# Patient Record
Sex: MALE | Race: WHITE | NOT HISPANIC OR LATINO
[De-identification: names, ages, dates, MRNs, and addresses within clinical notes are randomized per-mention and may not be internally consistent; named-entity substitution may affect disease eponyms.]

---

## 2023-01-23 ENCOUNTER — APPOINTMENT (OUTPATIENT)
Dept: PULMONOLOGY | Facility: CLINIC | Age: 53
End: 2023-01-23
Payer: COMMERCIAL

## 2023-01-23 VITALS
SYSTOLIC BLOOD PRESSURE: 151 MMHG | OXYGEN SATURATION: 97 % | HEART RATE: 65 BPM | TEMPERATURE: 97.7 F | WEIGHT: 265 LBS | DIASTOLIC BLOOD PRESSURE: 88 MMHG | BODY MASS INDEX: 34.01 KG/M2 | HEIGHT: 74 IN

## 2023-01-23 DIAGNOSIS — R63.5 ABNORMAL WEIGHT GAIN: ICD-10-CM

## 2023-01-23 PROBLEM — Z00.00 ENCOUNTER FOR PREVENTIVE HEALTH EXAMINATION: Status: ACTIVE | Noted: 2023-01-23

## 2023-01-23 PROCEDURE — 99204 OFFICE O/P NEW MOD 45 MIN: CPT

## 2023-01-23 NOTE — PHYSICAL EXAM
[General Appearance - In No Acute Distress] : no acute distress [Micrognathia] : micrognathia [II] : II [Neck Appearance] : the appearance of the neck was normal [Neck Cervical Mass (___cm)] : no neck mass was observed [Apical Impulse] : the apical impulse was normal [Heart Sounds] : normal S1 and S2 [] : no respiratory distress [Exaggerated Use Of Accessory Muscles For Inspiration] : no accessory muscle use [Abnormal Walk] : normal gait [Involuntary Movements] : no involuntary movements were seen [No Focal Deficits] : no focal deficits [Oriented To Time, Place, And Person] : oriented to person, place, and time [Affect] : the affect was normal

## 2023-01-25 NOTE — HISTORY OF PRESENT ILLNESS
[FreeTextEntry1] : 01/23/2023 :  MERON GERARD is a 52 year old never smoker male with PMHx HLD who is here for initial evaluation of insomnia and possible sleep apnea.\par \par He reports snoring and occasional dry mouth but denies morning HA. He gained over 20lb in the pandemic. He denies dozing off. He started experiencing insomnia about a year ago \par \par He takes Advil PM, NyQuil which helps him the most, melatonin, and "adrenal complex."  His PCP started him on \par clonazepam 0.5 since March but he only takes it  once/week with some improvement. \par \par Sleep Routine:\par \par He goes to bed at 10:30, sleep latency is about 20 min, he wakes up once, WASO varies and then he is up at 5A. He does not nap . No real excessive daytime somnolence with [place] sleepiness scale (out of 24 points) = 2.\par He denies cataplexy, RLS, parasomnia, or any other sleep behavioral issues. \par \par

## 2023-01-25 NOTE — ASSESSMENT
[FreeTextEntry1] : 53 y/o obese M with c/o insomnia and possible apnea who is here for initial visit.  Clonazepam OK for anxiety, advised him to avoid Nyquil, other OTC rx which is unlikely to be helpful.  Discussed sleep hygiene.  Will get overnight polysomnography to r/o sleep disordered breathing and assess sleep, suspect will need CBT for insomnia. \par

## 2023-02-06 ENCOUNTER — OUTPATIENT (OUTPATIENT)
Dept: OUTPATIENT SERVICES | Facility: HOSPITAL | Age: 53
LOS: 1 days | End: 2023-02-06
Payer: COMMERCIAL

## 2023-02-06 ENCOUNTER — APPOINTMENT (OUTPATIENT)
Dept: SLEEP CENTER | Facility: HOSPITAL | Age: 53
End: 2023-02-06

## 2023-02-06 DIAGNOSIS — G47.00 INSOMNIA, UNSPECIFIED: ICD-10-CM

## 2023-02-06 PROCEDURE — 95810 POLYSOM 6/> YRS 4/> PARAM: CPT

## 2023-02-06 PROCEDURE — 95810 POLYSOM 6/> YRS 4/> PARAM: CPT | Mod: 26

## 2023-03-06 ENCOUNTER — APPOINTMENT (OUTPATIENT)
Dept: PULMONOLOGY | Facility: CLINIC | Age: 53
End: 2023-03-06
Payer: COMMERCIAL

## 2023-03-06 VITALS
WEIGHT: 256 LBS | RESPIRATION RATE: 12 BRPM | TEMPERATURE: 97.7 F | HEIGHT: 74 IN | SYSTOLIC BLOOD PRESSURE: 133 MMHG | OXYGEN SATURATION: 97 % | HEART RATE: 65 BPM | DIASTOLIC BLOOD PRESSURE: 87 MMHG | BODY MASS INDEX: 32.85 KG/M2

## 2023-03-06 DIAGNOSIS — R06.83 SNORING: ICD-10-CM

## 2023-03-06 DIAGNOSIS — G47.00 INSOMNIA, UNSPECIFIED: ICD-10-CM

## 2023-03-06 PROCEDURE — 99214 OFFICE O/P EST MOD 30 MIN: CPT

## 2023-03-06 NOTE — ASSESSMENT
[FreeTextEntry1] : Impression:  insignificant sleep disordered breathing, psychophysiologic insomnia\par \par Discussed sleep hygiene at length.  Suggested mild sleep restriction- adjust bed hours to expected sleep duration. If not sleepy, get out of bed.  Keep consistent wake time no matter how poor perceived sleep is.  Relaxing time before bed, "worry time" before that.  Suggested sleep schedule of 11 pm  bedtime, 515  am wake time on office days, 615 on work from home days and weekends. \par \par More than 35 minutes spent on CBT for insomnia.\par .

## 2023-03-06 NOTE — HISTORY OF PRESENT ILLNESS
[FreeTextEntry1] : 01/23/2023 :  MERON GERARD is a 52 year old never smoker male with PMHx HLD who is here for initial evaluation of insomnia and possible sleep apnea.\par \par He reports snoring and occasional dry mouth but denies morning HA. He gained over 20lb in the pandemic. He denies dozing off. He started experiencing insomnia about a year ago \par \par He takes Advil PM, NyQuil which helps him the most, melatonin, and "adrenal complex."  His PCP started him on \par clonazepam 0.5 since March but he only takes it  once/week with some improvement. \par \par Sleep Routine:\par \par He goes to bed at 10:30, sleep latency is about 20 min, he wakes up once, WASO varies and then he is up at 5A. He does not nap . No real excessive daytime somnolence with [place] sleepiness scale (out of 24 points) = 2.\par He denies cataplexy, RLS, parasomnia, or any other sleep behavioral issues. \par \par 3/6/23: Reviewed overnight sleep study from February 6, 2023 with patient.  He actually had remarkably good sleep, with more than 7 hours sleep time, insignificant sleep disordered breathing with apnea-hypopnea index 7.4 (AASM criteria), 1.8 (CMS criteria).  He is concerned that not getting enough sleep, generally waking up before he needs to.  His wake time varies from 515 on some working days, to 615 on days when he works from home, to later than 8 on weekends.  He does not report daytime sleepiness.\par \par

## 2024-03-26 ENCOUNTER — APPOINTMENT (OUTPATIENT)
Dept: ORTHOPEDIC SURGERY | Facility: CLINIC | Age: 54
End: 2024-03-26

## 2024-06-21 ENCOUNTER — APPOINTMENT (OUTPATIENT)
Dept: ORTHOPEDIC SURGERY | Facility: CLINIC | Age: 54
End: 2024-06-21
Payer: SELF-PAY

## 2024-06-21 DIAGNOSIS — M54.16 RADICULOPATHY, LUMBAR REGION: ICD-10-CM

## 2024-06-21 PROCEDURE — 99204 OFFICE O/P NEW MOD 45 MIN: CPT | Mod: 57

## 2024-06-21 RX ORDER — DICLOFENAC SODIUM 75 MG/1
75 TABLET, DELAYED RELEASE ORAL
Qty: 60 | Refills: 0 | Status: ACTIVE | COMMUNITY
Start: 2024-06-21 | End: 1900-01-01

## 2024-06-21 NOTE — ASSESSMENT
[FreeTextEntry1] : - Worsening low back pain - Left greater than right sciatica - Status post L4-L5 discectomy

## 2024-06-21 NOTE — PLAN
[TextEntry] : - Diclofenac, 75 mg PO, as needed, up to twice daily - Lumbar MRI - Follow-up with Dr. Salvador once MRI is completed

## 2024-07-23 ENCOUNTER — APPOINTMENT (OUTPATIENT)
Dept: ORTHOPEDIC SURGERY | Facility: CLINIC | Age: 54
End: 2024-07-23
Payer: SELF-PAY

## 2024-07-23 ENCOUNTER — TRANSCRIPTION ENCOUNTER (OUTPATIENT)
Age: 54
End: 2024-07-23

## 2024-07-23 VITALS
TEMPERATURE: 96.6 F | HEIGHT: 74 IN | HEART RATE: 78 BPM | OXYGEN SATURATION: 98 % | BODY MASS INDEX: 32.73 KG/M2 | DIASTOLIC BLOOD PRESSURE: 93 MMHG | SYSTOLIC BLOOD PRESSURE: 152 MMHG | WEIGHT: 255 LBS

## 2024-07-23 DIAGNOSIS — M51.26 OTHER INTERVERTEBRAL DISC DISPLACEMENT, LUMBAR REGION: ICD-10-CM

## 2024-07-23 DIAGNOSIS — M48.061 SPINAL STENOSIS, LUMBAR REGION WITHOUT NEUROGENIC CLAUDICATION: ICD-10-CM

## 2024-07-23 PROCEDURE — 99203 OFFICE O/P NEW LOW 30 MIN: CPT

## 2024-07-24 NOTE — ASSESSMENT
[FreeTextEntry1] : Patient presenting for MRI review for left-sided foot numbness, he has evidence of a recurrent disc herniation at L4-5 on the left, unchanged from his last MRI from November 2023.

## 2024-07-24 NOTE — HISTORY OF PRESENT ILLNESS
[de-identified] : Patient is here for a review of his most recent MRI performed on July 1st for left-sided foot numbness. He denies weakness, he is apprehensive about playing pick-up ball, but he is exercising weekly. He has minimal to no back pain.

## 2024-07-24 NOTE — PHYSICAL EXAM
[de-identified] : He has evidence of a recurrent disc herniation at L4 and L5 on the left, which is unchanged from his last MRI from November 2023.

## 2024-07-24 NOTE — PLAN
[TextEntry] : I recommended observation, activity modification, and anti-inflammatories and follow up via phone call, text, or email if symptoms begin to interfere with quality of life. If so, I would consider revision discectomy in left L4-5. He has a history of prior discectomy. He reports that this was at L2-3. This is his second herniation at L4-5.  Sahil has intact strength, despite a recurrent disc herniation at L4-5 to the left. He's apprehensive to play pickleball. I reassured him that he can. If his symptoms are intolerable, he may consider revision discectomy of left L4-5, which at this point he is not anxious to pursue. His pain is tolerable, his strength is intact. He'll follow up with us, PRN.

## 2024-07-24 NOTE — PHYSICAL EXAM
[de-identified] : He has evidence of a recurrent disc herniation at L4 and L5 on the left, which is unchanged from his last MRI from November 2023.

## 2024-07-24 NOTE — END OF VISIT
[FreeTextEntry3] :   Documented by Mulugeta Vora acting as a scribe for Dr. Sam Salvador. 07/23/2024   All medical record entries made by the Mulugeta Vora (Scribe) were at my, Dr. Sam Salvador. direction and personally dictated by me on 07/23/2024. I have reviewed the chart and agree that the record accurately reflects my personal performance of the history, physical exam, assessment and plan. I have also personally directed, reviewed, and agreed with the chart.

## 2024-07-24 NOTE — ADDENDUM
[FreeTextEntry1] :   Documented by Mulugeta Vora acting as a scribe for Dr. Sam Salvador. 07/23/2024

## 2024-07-24 NOTE — HISTORY OF PRESENT ILLNESS
[de-identified] : Patient is here for a review of his most recent MRI performed on July 1st for left-sided foot numbness. He denies weakness, he is apprehensive about playing pick-up ball, but he is exercising weekly. He has minimal to no back pain.

## 2025-03-28 ENCOUNTER — APPOINTMENT (OUTPATIENT)
Dept: ORTHOPEDIC SURGERY | Facility: CLINIC | Age: 55
End: 2025-03-28

## 2025-03-28 DIAGNOSIS — M54.16 RADICULOPATHY, LUMBAR REGION: ICD-10-CM

## 2025-03-28 DIAGNOSIS — M48.061 SPINAL STENOSIS, LUMBAR REGION WITHOUT NEUROGENIC CLAUDICATION: ICD-10-CM

## 2025-03-28 PROCEDURE — 99213 OFFICE O/P EST LOW 20 MIN: CPT | Mod: 95

## 2025-03-28 RX ORDER — PREGABALIN 50 MG/1
50 CAPSULE ORAL
Refills: 0 | Status: ACTIVE | COMMUNITY

## 2025-03-28 RX ORDER — GABAPENTIN 600 MG/1
600 TABLET, COATED ORAL
Refills: 0 | Status: ACTIVE | COMMUNITY

## 2025-04-22 ENCOUNTER — APPOINTMENT (OUTPATIENT)
Dept: ORTHOPEDIC SURGERY | Facility: CLINIC | Age: 55
End: 2025-04-22

## 2025-04-22 VITALS
HEART RATE: 71 BPM | HEIGHT: 74 IN | WEIGHT: 225 LBS | DIASTOLIC BLOOD PRESSURE: 77 MMHG | BODY MASS INDEX: 28.88 KG/M2 | SYSTOLIC BLOOD PRESSURE: 128 MMHG | OXYGEN SATURATION: 98 %

## 2025-04-22 DIAGNOSIS — M51.26 OTHER INTERVERTEBRAL DISC DISPLACEMENT, LUMBAR REGION: ICD-10-CM

## 2025-04-22 DIAGNOSIS — M54.16 RADICULOPATHY, LUMBAR REGION: ICD-10-CM

## 2025-04-22 PROCEDURE — 99213 OFFICE O/P EST LOW 20 MIN: CPT

## 2025-04-29 ENCOUNTER — APPOINTMENT (OUTPATIENT)
Dept: ORTHOPEDIC SURGERY | Facility: CLINIC | Age: 55
End: 2025-04-29